# Patient Record
Sex: FEMALE | Race: WHITE | Employment: UNEMPLOYED | URBAN - METROPOLITAN AREA
[De-identification: names, ages, dates, MRNs, and addresses within clinical notes are randomized per-mention and may not be internally consistent; named-entity substitution may affect disease eponyms.]

---

## 2024-03-21 ENCOUNTER — HOSPITAL ENCOUNTER (EMERGENCY)
Age: 54
Discharge: HOME | End: 2024-03-21
Attending: STUDENT IN AN ORGANIZED HEALTH CARE EDUCATION/TRAINING PROGRAM
Payer: COMMERCIAL

## 2024-03-21 ENCOUNTER — APPOINTMENT (OUTPATIENT)
Dept: GENERAL RADIOLOGY | Age: 54
End: 2024-03-21
Payer: COMMERCIAL

## 2024-03-21 VITALS
SYSTOLIC BLOOD PRESSURE: 129 MMHG | OXYGEN SATURATION: 96 % | DIASTOLIC BLOOD PRESSURE: 82 MMHG | RESPIRATION RATE: 15 BRPM | TEMPERATURE: 98.6 F | HEART RATE: 102 BPM

## 2024-03-21 DIAGNOSIS — U07.1 COVID: Primary | ICD-10-CM

## 2024-03-21 LAB
FLUAV RNA RESP QL NAA+PROBE: NOT DETECTED
FLUBV RNA RESP QL NAA+PROBE: NOT DETECTED
SARS-COV-2 RNA RESP QL NAA+PROBE: DETECTED

## 2024-03-21 PROCEDURE — 99284 EMERGENCY DEPT VISIT MOD MDM: CPT

## 2024-03-21 PROCEDURE — 87636 SARSCOV2 & INF A&B AMP PRB: CPT

## 2024-03-21 PROCEDURE — 71045 X-RAY EXAM CHEST 1 VIEW: CPT

## 2024-03-21 ASSESSMENT — PAIN - FUNCTIONAL ASSESSMENT: PAIN_FUNCTIONAL_ASSESSMENT: NONE - DENIES PAIN

## 2024-03-21 NOTE — ED PROVIDER NOTES
SpO2: 96 %     Procedures     na    ED Course and MDM     Caty Hermosillo is a 53 y.o. female who presents with concern for mold exposure.  Here she is afebrile hemodynamically stable.  She is quite anxious appearing and concerned about her overall health.  I believe this is contributing to her mild tachycardia.  Her physical examination is benign.  Her breath sounds are clear there is no increased work of breathing or other markers of respiratory failure.  We discussed at length next steps.  We discussed the difficulty with testing for abnormalities in this type of scenario ultimately agreed to assess a chest x-ray and will also screen for COVID and influenza as the patient has some URI symptoms.    ED Course as of 03/21/24 1631   Thu Mar 21, 2024   1630 Throughout her observation the patient remained hemodynamically stable in no acute distress.  On reassessment she appears more calm.  She did have a positive COVID screen which I believe may be contributing to her symptoms.  There are otherwise no high risk features her chest x-ray is benign and given this course there is no indication for further testing or treatment in the emergency department and the patient is appropriate for discharge.  Consideration is given to a possible psychiatric cause of her condition though I appreciate no high risk features no suicidality failure of self-care or other features which would require hospitalization at this time.  This will conclude her ED course [NG]      ED Course User Index  [NG] Anthony Austin MD       Additional Reassessment    Is this patient to be included in the SEP-1 core measure? No Exclusion criteria - the patient is NOT to be included for SEP-1 Core Measure due to: Viral etiology found or highly suspected (including COVID-19) without concomitant bacterial infection    Medical Decision Making  Presentation for concern for mold exposure reassuring clinical course found with COVID no high risk features amenable to

## 2024-03-21 NOTE — DISCHARGE INSTRUCTIONS
You were evaluated in the emergency department for concern for mold exposure. Assessments and testing completed during your visit were reassuring and at this time there is no indication for further testing, treatment or admission to the hospital. Given this it is appropriate to discharge you from the emergency department. At the time of discharge we discussed the following:    Your chest x-ray is normal however you do have COVID which may be contributing to your symptoms.  At this time it is safe to discharge you home for routine home care including maintaining good rest and hydration and you may use the over-the-counter pain reliever fever reducer of your choice and your condition is expected to improve.  I would recommend speaking with your primary doctor for long-term recommendations.    Please note that sometimes it is difficult to diagnose a medical condition early in the disease process before the disease is fully manifest. Because of this, should you develop any new or worsening symptoms, you may return at any time to the emergency department for another evaluation. If available you are also recommended to review this visit with your primary care physician or other medical provider in the next 7 days. Thank you for allowing us to care for you today.